# Patient Record
Sex: MALE | Race: OTHER | URBAN - METROPOLITAN AREA
[De-identification: names, ages, dates, MRNs, and addresses within clinical notes are randomized per-mention and may not be internally consistent; named-entity substitution may affect disease eponyms.]

---

## 2017-01-10 ENCOUNTER — OFFICE VISIT (OUTPATIENT)
Dept: ORTHOPEDIC SURGERY | Age: 32
End: 2017-01-10

## 2017-01-10 VITALS
SYSTOLIC BLOOD PRESSURE: 126 MMHG | HEIGHT: 72 IN | HEART RATE: 72 BPM | WEIGHT: 185 LBS | BODY MASS INDEX: 25.06 KG/M2 | DIASTOLIC BLOOD PRESSURE: 87 MMHG

## 2017-01-10 DIAGNOSIS — M22.2X2 PATELLOFEMORAL SYNDROME, LEFT: ICD-10-CM

## 2017-01-10 DIAGNOSIS — M25.561 PAIN IN BOTH KNEES, UNSPECIFIED CHRONICITY: Primary | ICD-10-CM

## 2017-01-10 DIAGNOSIS — M25.562 PAIN IN BOTH KNEES, UNSPECIFIED CHRONICITY: Primary | ICD-10-CM

## 2017-01-10 PROCEDURE — 99203 OFFICE O/P NEW LOW 30 MIN: CPT | Performed by: ORTHOPAEDIC SURGERY

## 2017-01-10 PROCEDURE — 73562 X-RAY EXAM OF KNEE 3: CPT | Performed by: ORTHOPAEDIC SURGERY

## 2017-01-19 ENCOUNTER — OFFICE VISIT (OUTPATIENT)
Dept: ORTHOPEDIC SURGERY | Age: 32
End: 2017-01-19

## 2017-01-19 VITALS
BODY MASS INDEX: 25.2 KG/M2 | DIASTOLIC BLOOD PRESSURE: 77 MMHG | HEART RATE: 61 BPM | SYSTOLIC BLOOD PRESSURE: 124 MMHG | WEIGHT: 180 LBS | HEIGHT: 71 IN

## 2017-01-19 DIAGNOSIS — M67.52 PLICA SYNDROME OF KNEE, LEFT: Primary | ICD-10-CM

## 2017-01-19 PROCEDURE — 99213 OFFICE O/P EST LOW 20 MIN: CPT | Performed by: ORTHOPAEDIC SURGERY

## 2024-05-22 ENCOUNTER — OFFICE VISIT (OUTPATIENT)
Dept: ORTHOPEDIC SURGERY | Age: 39
End: 2024-05-22
Payer: COMMERCIAL

## 2024-05-22 VITALS — BODY MASS INDEX: 25.2 KG/M2 | WEIGHT: 180 LBS | HEIGHT: 71 IN

## 2024-05-22 DIAGNOSIS — M51.36 DDD (DEGENERATIVE DISC DISEASE), LUMBAR: ICD-10-CM

## 2024-05-22 DIAGNOSIS — M54.50 CHRONIC BILATERAL LOW BACK PAIN, UNSPECIFIED WHETHER SCIATICA PRESENT: Primary | ICD-10-CM

## 2024-05-22 DIAGNOSIS — G89.29 CHRONIC BILATERAL LOW BACK PAIN, UNSPECIFIED WHETHER SCIATICA PRESENT: Primary | ICD-10-CM

## 2024-05-22 PROCEDURE — 99204 OFFICE O/P NEW MOD 45 MIN: CPT | Performed by: PHYSICIAN ASSISTANT

## 2024-05-22 RX ORDER — MELOXICAM 15 MG/1
15 TABLET ORAL DAILY PRN
Qty: 30 TABLET | Refills: 0 | Status: SHIPPED | OUTPATIENT
Start: 2024-05-22 | End: 2024-06-21

## 2024-05-22 NOTE — PROGRESS NOTES
ROS reviewed & scanned into chart  CONSTITUTIONAL: Denies unexplained weight loss, fevers   SKIN: Denies active skin conditions   ENT: Denies dizziness, nosebleeds  RESPIRATORY: Denies difficulty breathing  CARDIOVASCULAR: Denies new chest pain   NEUROLOGICAL: Denies progressive weakness  PSYCHOLOGICAL: Denies anxiety, depression   HEMATOLOGIC: Denies blood disorders, cancer  ENDOCRINE: Denies excessive thirst, heat/cold  GI: Denies current nausea, vomiting, diarrhea   : Denies new bowel or bladder incontinence       PHYSICAL EXAM:    Vitals:      5/22/2024 9:36 AM    Weight 81.6 kg (180 lb)   Height 1.803 m (5' 11\")   Pain Score 0 - No pain   Pain Loc Back   Pain Edu? Yes       GENERAL EXAM:  General Apparence: Patient is adequately groomed with no evidence of malnutrition.  Orientation: The patient is oriented to time, place and person.   Mood & Affect:The patient's mood and affect are appropriate   Vascular: Examination reveals no swelling tenderness in upper or lower extremities. Good capillary refill  Lymphatic: The lymphatic examination bilaterally reveals all areas to be without enlargement or induration  Sensation: Sensation is intact without deficit  Coordination/Balance: Good coordination     LUMBAR/SACRAL EXAMINATION:  Inspection: Local inspection shows no step-off or bruising.  Lumbar alignment is normal.  Sagittal and Coronal balance is neutral.      Palpation:   No evidence of tenderness at the midline.  He points around L5-S1 as to where pain is typically located  Range of Motion: Mild loss of flexion and extension  Strength:   Strength testing is 5/5 in all muscle groups tested.   Special Tests:   Straight leg raise and crossed SLR negative.  Leg length and pelvis level.  0 out of 5 Elkin's signs.        Skin: There are no rashes, ulcerations or lesions.  Reflexes: Reflexes are symmetrically 1+ with reinforcement at the patellar and ankle tendons.  Clonus absent bilaterally at the feet.  Gait &

## 2024-05-23 ENCOUNTER — TELEPHONE (OUTPATIENT)
Dept: ORTHOPEDIC SURGERY | Age: 39
End: 2024-05-23

## 2024-05-23 NOTE — TELEPHONE ENCOUNTER
S/W eFrnando Rasmussen  regarding MRI LUMBAR SPINE approval and authorization being valid until 6/20/2024.  Patient was instructed that their MRI needs to be scheduled at  [facility] . The patient was instructed to contact the facility to schedule  at [phone].      A follow up appointment will need to be scheduled to review the results and treatment plan.

## 2024-05-23 NOTE — TELEPHONE ENCOUNTER
S/W the patient regarding his request for his MRI to be faxed to [facility] [address]    [place] [address]  [phone]  FAX: [phone]    I will fax over MRI order to their office, patient was instructed to call and schedule the MRI if [facility] had not called him to schedule. He voiced understanding.

## 2024-05-23 NOTE — TELEPHONE ENCOUNTER
General Question     Subject: REQUESTING HIS MRI BE FAXED TO [facility] IN [place].  FAX: [phone]  Patient and /or Facility Request: Fernando Rasmussen   Contact Number: [phone]

## 2024-06-12 ENCOUNTER — HOSPITAL ENCOUNTER (OUTPATIENT)
Dept: PHYSICAL THERAPY | Age: 39
Setting detail: THERAPIES SERIES
Discharge: HOME | End: 2024-06-12
Payer: COMMERCIAL

## 2024-06-12 DIAGNOSIS — M54.50 CHRONIC BILATERAL LOW BACK PAIN WITHOUT SCIATICA: Primary | ICD-10-CM

## 2024-06-12 DIAGNOSIS — G89.29 CHRONIC BILATERAL LOW BACK PAIN WITHOUT SCIATICA: Primary | ICD-10-CM

## 2024-06-12 PROCEDURE — 97110 THERAPEUTIC EXERCISES: CPT

## 2024-06-12 PROCEDURE — 97161 PT EVAL LOW COMPLEX 20 MIN: CPT

## 2024-06-12 NOTE — PLAN OF CARE
(25829) HOME EXERCISE PROGRAM - Reviewed/Progressed HEP activities related to strengthening, flexibility, endurance, ROM performed to prevent loss of range of motion, maintain or improve muscular strength or increase flexibility, following either an injury or surgery.    GOALS     Patient stated goal: confirm home plan is adequate, add additional routines  [] Progressing: [] Met: [] Not Met: [] Adjusted    Therapist goals for Patient:   Short Term Goals: To be achieved in: 2 weeks  1. Independent in HEP and progression per patient tolerance, in order to prevent re-injury.   [] Progressing: [] Met: [] Not Met: [] Adjusted  2. Patient will have a decrease in pain to <2/10 to facilitate improvement in movement, function, and ADLs as indicated by Functional Deficits.  [] Progressing: [] Met: [] Not Met: [] Adjusted    Long Term Goals: To be achieved in: 12 weeks  1. Disability index score of 5% or less for the Modified Oswestry to assist with reaching prior level of function with activities such as prolonged standing.  [] Progressing: [] Met: [] Not Met: [] Adjusted  2. Patient will demonstrate increased AROM of lumbar musculature to WFL without pain to allow for proper joint functioning to enable patient to perform pain free ADL.   [] Progressing: [] Met: [] Not Met: [] Adjusted  3. Patient will demonstrate increased Strength of hip musculature to at least 5/5 throughout without pain to allow for proper functional mobility to enable patient to return to standing >1 hr with no ? pain.   [] Progressing: [] Met: [] Not Met: [] Adjusted  4. Patient will return to all ADL without increased symptoms or restriction.   [] Progressing: [] Met: [] Not Met: [] Adjusted  5. Pt will demonstrate good form with all exercises and understand progression to manage chronic pain.   [] Progressing: [] Met: [] Not Met: [] Adjusted     Overall Progression Towards Functional goals/ Treatment Progress Update:  [] Patient is progressing as

## 2024-06-18 ENCOUNTER — TELEPHONE (OUTPATIENT)
Dept: ORTHOPEDIC SURGERY | Age: 39
End: 2024-06-18

## 2024-06-18 NOTE — TELEPHONE ENCOUNTER
S/W the patient informing him we received his MRI results and wanted to schedule the patient for F/U appointment. He was scheduled for Wednesday 6/26/24 @ 11AM @ the [place] office. He voiced understanding.

## 2024-06-26 ENCOUNTER — OFFICE VISIT (OUTPATIENT)
Dept: ORTHOPEDIC SURGERY | Age: 39
End: 2024-06-26
Payer: COMMERCIAL

## 2024-06-26 VITALS — HEIGHT: 71 IN | WEIGHT: 180 LBS | BODY MASS INDEX: 25.2 KG/M2

## 2024-06-26 DIAGNOSIS — G89.29 CHRONIC BILATERAL LOW BACK PAIN, UNSPECIFIED WHETHER SCIATICA PRESENT: Primary | ICD-10-CM

## 2024-06-26 DIAGNOSIS — M54.50 CHRONIC BILATERAL LOW BACK PAIN, UNSPECIFIED WHETHER SCIATICA PRESENT: Primary | ICD-10-CM

## 2024-06-26 DIAGNOSIS — M51.36 DDD (DEGENERATIVE DISC DISEASE), LUMBAR: ICD-10-CM

## 2024-06-26 DIAGNOSIS — M48.061 SPINAL STENOSIS OF LUMBAR REGION WITHOUT NEUROGENIC CLAUDICATION: ICD-10-CM

## 2024-06-26 PROCEDURE — 99213 OFFICE O/P EST LOW 20 MIN: CPT | Performed by: PHYSICIAN ASSISTANT

## 2024-06-26 NOTE — PROGRESS NOTES
FOLLOW UP: SPINE    6/26/2024     CHIEF COMPLAINT:  Chronic back pain, L MRI review     HISTORY OF PRESENT ILLNESS:              The patient is a 39 y.o. male here to review lumbar MRI for chronic worsening back pain.  He reports a history of low back pain which has been ongoing since 2017.  He describes periodic flareups which have been occurring more frequently over the last year. He describes aching and stabbing midline low back pain.  Initially symptoms were radiating into the left lower extremity however this has resolved.  Really he describes more of a \"stiffness\" in his lower back.  Pain is increased with any prolonged standing, bending.  He reports relief with sitting and resting.  Conservative care includes PT, chiropractics, NSAIDs, MARGIE in 2018 without significant benefit.  At this current time he denies any lower extremity radiating pain.  He denies any progressive numbness tingling or progressive extremity weakness.  Denies any recent bowel or bladder dysfunction or saddle anesthesia.  No recent injury or trauma.  No recent fevers chills or infections.  Does report some improvement at this time overall.    The pain assessment was noted & reviewed in the medical record today.     Current/Past Treatment:   Physical Therapy: Yes  Chiropractic:   Yes  Injection: L4-5 MIRNA Robles 2018  Medications:            NSAIDS: Yes, has Mobic on hand            Muscle relaxer:              Steriods:              Neuropathic medications:              Opioids:            Other:   Surgery/Consult: no    Work Status/Functionality: IT    Past Medical History: Medical history form was reviewed today & scanned into the media tab  No past medical history on file.   Past Surgical History:     No past surgical history on file.  Current Medications:     Current Outpatient Medications:     meloxicam (MOBIC) 15 MG tablet, Take 1 tablet by mouth daily as needed for Pain, Disp: 30 tablet, Rfl: 0  Allergies:  Patient has no